# Patient Record
Sex: MALE | Race: BLACK OR AFRICAN AMERICAN
[De-identification: names, ages, dates, MRNs, and addresses within clinical notes are randomized per-mention and may not be internally consistent; named-entity substitution may affect disease eponyms.]

---

## 2019-06-26 ENCOUNTER — HOSPITAL ENCOUNTER (EMERGENCY)
Dept: HOSPITAL 26 - MED | Age: 59
Discharge: HOME | End: 2019-06-26
Payer: COMMERCIAL

## 2019-06-26 VITALS — DIASTOLIC BLOOD PRESSURE: 80 MMHG | SYSTOLIC BLOOD PRESSURE: 159 MMHG

## 2019-06-26 VITALS — DIASTOLIC BLOOD PRESSURE: 75 MMHG | SYSTOLIC BLOOD PRESSURE: 140 MMHG

## 2019-06-26 VITALS — WEIGHT: 178 LBS | HEIGHT: 65 IN | BODY MASS INDEX: 29.66 KG/M2

## 2019-06-26 DIAGNOSIS — R25.2: Primary | ICD-10-CM

## 2019-06-26 DIAGNOSIS — E86.0: ICD-10-CM

## 2019-06-26 LAB
ALBUMIN FLD-MCNC: 3.7 G/DL (ref 3.4–5)
ANION GAP SERPL CALCULATED.3IONS-SCNC: 12.2 MMOL/L (ref 8–16)
APPEARANCE UR: CLEAR
AST SERPL-CCNC: 53 U/L (ref 15–37)
BARBITURATES UR QL SCN: (no result) NG/ML
BASOPHILS # BLD AUTO: 0.1 K/UL (ref 0–0.22)
BASOPHILS NFR BLD AUTO: 1.7 % (ref 0–2)
BENZODIAZ UR QL SCN: (no result) NG/ML
BILIRUB SERPL-MCNC: 0.9 MG/DL (ref 0–1)
BILIRUB UR QL STRIP: NEGATIVE
BUN SERPL-MCNC: 17 MG/DL (ref 7–18)
BZE UR QL SCN: (no result) NG/ML
CANNABINOIDS UR QL SCN: (no result) NG/ML
CHLORIDE SERPL-SCNC: 108 MMOL/L (ref 98–107)
CO2 SERPL-SCNC: 26.4 MMOL/L (ref 21–32)
COLOR UR: YELLOW
CREAT SERPL-MCNC: 1.1 MG/DL (ref 0.7–1.3)
EOSINOPHIL # BLD AUTO: 0.2 K/UL (ref 0–0.4)
EOSINOPHIL NFR BLD AUTO: 2.1 % (ref 0–4)
ERYTHROCYTE [DISTWIDTH] IN BLOOD BY AUTOMATED COUNT: 13.5 % (ref 11.6–13.7)
GFR SERPL CREATININE-BSD FRML MDRD: 88 ML/MIN (ref 90–?)
GLUCOSE SERPL-MCNC: 71 MG/DL (ref 74–106)
GLUCOSE UR STRIP-MCNC: NEGATIVE MG/DL
HCT VFR BLD AUTO: 41.3 % (ref 36–52)
HGB BLD-MCNC: 13.9 G/DL (ref 12–18)
HGB UR QL STRIP: NEGATIVE
LEUKOCYTE ESTERASE UR QL STRIP: NEGATIVE
LYMPHOCYTES # BLD AUTO: 2.9 K/UL (ref 2–11.5)
LYMPHOCYTES NFR BLD AUTO: 39.9 % (ref 20.5–51.1)
MCH RBC QN AUTO: 34 PG (ref 27–31)
MCHC RBC AUTO-ENTMCNC: 34 G/DL (ref 33–37)
MCV RBC AUTO: 99.6 FL (ref 80–94)
MONOCYTES # BLD AUTO: 0.5 K/UL (ref 0.8–1)
MONOCYTES NFR BLD AUTO: 6.6 % (ref 1.7–9.3)
NEUTROPHILS # BLD AUTO: 3.6 K/UL (ref 1.8–7.7)
NEUTROPHILS NFR BLD AUTO: 49.7 % (ref 42.2–75.2)
NITRITE UR QL STRIP: NEGATIVE
OPIATES UR QL SCN: (no result) NG/ML
PCP UR QL SCN: (no result) NG/ML
PH UR STRIP: 5.5 [PH] (ref 5–9)
PLATELET # BLD AUTO: 201 K/UL (ref 140–450)
POTASSIUM SERPL-SCNC: 4.6 MMOL/L (ref 3.5–5.1)
RBC # BLD AUTO: 4.15 MIL/UL (ref 4.2–6.1)
SODIUM SERPL-SCNC: 142 MMOL/L (ref 136–145)
WBC # BLD AUTO: 7.2 K/UL (ref 4.8–10.8)

## 2019-06-26 PROCEDURE — 80053 COMPREHEN METABOLIC PANEL: CPT

## 2019-06-26 PROCEDURE — 36415 COLL VENOUS BLD VENIPUNCTURE: CPT

## 2019-06-26 PROCEDURE — 80305 DRUG TEST PRSMV DIR OPT OBS: CPT

## 2019-06-26 PROCEDURE — 85025 COMPLETE CBC W/AUTO DIFF WBC: CPT

## 2019-06-26 PROCEDURE — 96372 THER/PROPH/DIAG INJ SC/IM: CPT

## 2019-06-26 PROCEDURE — 81003 URINALYSIS AUTO W/O SCOPE: CPT

## 2019-06-26 PROCEDURE — 99283 EMERGENCY DEPT VISIT LOW MDM: CPT

## 2019-06-26 NOTE — NUR
PATIENT PRESENTS TO ED WITH C/O LT LEG PAIN FOR APPROX 2 WEEKS. PT DENIES ANY 
TRAUMA/INJURY. NO OBVIOUS DEFORMITY NOTED. +ROM TO AFFECTED EXTREMITY. PATIENT 
STATES PAIN OF 9/10 AT THIS TIME; VSS;  POSITIONED FOR COMFORT; BEDRAILS UP X1; 
BED DOWN. ER MD TO EVALUATE PT.

## 2019-07-03 ENCOUNTER — HOSPITAL ENCOUNTER (EMERGENCY)
Dept: HOSPITAL 26 - MED | Age: 59
Discharge: HOME | End: 2019-07-03
Payer: COMMERCIAL

## 2019-07-03 VITALS — SYSTOLIC BLOOD PRESSURE: 153 MMHG | DIASTOLIC BLOOD PRESSURE: 83 MMHG

## 2019-07-03 VITALS — WEIGHT: 176 LBS | BODY MASS INDEX: 29.32 KG/M2 | HEIGHT: 65 IN

## 2019-07-03 VITALS — SYSTOLIC BLOOD PRESSURE: 155 MMHG | DIASTOLIC BLOOD PRESSURE: 95 MMHG

## 2019-07-03 DIAGNOSIS — F17.210: ICD-10-CM

## 2019-07-03 DIAGNOSIS — M54.42: Primary | ICD-10-CM

## 2019-07-03 NOTE — NUR
Patient discharged with v/s stable. Written and verbal after care instructions 
given and explained. 

Patient alert, oriented and verbalized understanding of instructions. 
Ambulatory with steady gait. All questions addressed prior to discharge. ID 
band removed. Patient advised to follow up with PMD. Rx of Valium and Ibuprofen 
given. Patient educated on indication of medication including possible reaction 
and side effects. Opportunity to ask questions provided and answered.

## 2019-07-03 NOTE — NUR
PT C/O R LEG PAIN RADIATING FROM HIP TO CALF, 8/10, STABBING X 1 WEEK. PT WAS 
SEEN HERE 6/26/19, AND IS STILL HAVING PAIN. DENIES INJURY. AMBULATORY WITH 
STEADY GAIT. DENIES N/V/D; SKIN IS PINK/WARM/DRY; AAOX4; PT DENIES ANY FEVER, 
CP, SOB, OR COUGH AT THIS TIME; PATIENT STATES PAIN OF 8/10 AT THIS TIME; VSS; 
PATIENT POSITIONED FOR COMFORT; HOB ELEVATED; BEDRAILS UP X1; BED DOWN. ER MD 
MADE AWARE OF PT STATUS.

## 2019-07-08 ENCOUNTER — HOSPITAL ENCOUNTER (EMERGENCY)
Dept: HOSPITAL 26 - MED | Age: 59
Discharge: HOME | End: 2019-07-08
Payer: COMMERCIAL

## 2019-07-08 VITALS — HEIGHT: 65 IN | BODY MASS INDEX: 29.55 KG/M2 | WEIGHT: 177.38 LBS

## 2019-07-08 VITALS — SYSTOLIC BLOOD PRESSURE: 164 MMHG | DIASTOLIC BLOOD PRESSURE: 75 MMHG

## 2019-07-08 VITALS — SYSTOLIC BLOOD PRESSURE: 146 MMHG | DIASTOLIC BLOOD PRESSURE: 82 MMHG

## 2019-07-08 DIAGNOSIS — F17.210: ICD-10-CM

## 2019-07-08 DIAGNOSIS — M79.605: ICD-10-CM

## 2019-07-08 DIAGNOSIS — G89.29: Primary | ICD-10-CM

## 2019-07-08 NOTE — NUR
Patient discharged with v/s stable. Written and verbal after care instructions 
given and explained. 

Patient alert, oriented and verbalized understanding of instructions. 
Ambulatory with steady gait. All questions addressed prior to discharge. ID 
band removed. Patient advised to follow up with PMD. Rx of NAPROSYN AND 
TRAMADOL given. Patient educated on indication of medication including possible 
reaction and side effects. Opportunity to ask questions provided and answered.

## 2019-07-08 NOTE — NUR
PT BIB SELF TO THE ED WITH THE CHIEF C/O LEFT LEG PAIN FOR 3 WEEKS. DENIES 
FALLS. AMBULATES WITH PAIN. PT WAS HERE LAST WEEK FOR SAME REASON. TAKING 
IBUPROFEN AND VALIUM FROM LAST VISIT. STATES PAIN OF 6/10 AT THIS TIME. DENIES 
ANY OTHER PROBLEM.

## 2019-07-17 ENCOUNTER — HOSPITAL ENCOUNTER (EMERGENCY)
Dept: HOSPITAL 26 - MED | Age: 59
Discharge: HOME | End: 2019-07-17
Payer: COMMERCIAL

## 2019-07-17 VITALS — DIASTOLIC BLOOD PRESSURE: 73 MMHG | SYSTOLIC BLOOD PRESSURE: 138 MMHG

## 2019-07-17 VITALS — SYSTOLIC BLOOD PRESSURE: 132 MMHG | DIASTOLIC BLOOD PRESSURE: 83 MMHG

## 2019-07-17 VITALS — BODY MASS INDEX: 29.99 KG/M2 | HEIGHT: 65 IN | WEIGHT: 180 LBS

## 2019-07-17 DIAGNOSIS — Z71.6: ICD-10-CM

## 2019-07-17 DIAGNOSIS — M79.605: Primary | ICD-10-CM

## 2019-07-17 DIAGNOSIS — F17.200: ICD-10-CM

## 2019-07-17 LAB
ALBUMIN FLD-MCNC: 3.9 G/DL (ref 3.4–5)
ANION GAP SERPL CALCULATED.3IONS-SCNC: 16.2 MMOL/L (ref 8–16)
APPEARANCE UR: CLEAR
AST SERPL-CCNC: 33 U/L (ref 15–37)
BASOPHILS # BLD AUTO: 0.1 K/UL (ref 0–0.22)
BASOPHILS NFR BLD AUTO: 1.1 % (ref 0–2)
BILIRUB SERPL-MCNC: 0.9 MG/DL (ref 0–1)
BILIRUB UR QL STRIP: NEGATIVE
BUN SERPL-MCNC: 19 MG/DL (ref 7–18)
CHLORIDE SERPL-SCNC: 107 MMOL/L (ref 98–107)
CO2 SERPL-SCNC: 24.2 MMOL/L (ref 21–32)
COLOR UR: YELLOW
CREAT SERPL-MCNC: 1.2 MG/DL (ref 0.7–1.3)
EOSINOPHIL # BLD AUTO: 0.3 K/UL (ref 0–0.4)
EOSINOPHIL NFR BLD AUTO: 4 % (ref 0–4)
ERYTHROCYTE [DISTWIDTH] IN BLOOD BY AUTOMATED COUNT: 13.2 % (ref 11.6–13.7)
GFR SERPL CREATININE-BSD FRML MDRD: 80 ML/MIN (ref 90–?)
GLUCOSE SERPL-MCNC: 78 MG/DL (ref 74–106)
GLUCOSE UR STRIP-MCNC: NEGATIVE MG/DL
HCT VFR BLD AUTO: 41.9 % (ref 36–52)
HGB BLD-MCNC: 14 G/DL (ref 12–18)
HGB UR QL STRIP: NEGATIVE
LEUKOCYTE ESTERASE UR QL STRIP: NEGATIVE
LYMPHOCYTES # BLD AUTO: 3.1 K/UL (ref 2–11.5)
LYMPHOCYTES NFR BLD AUTO: 43.8 % (ref 20.5–51.1)
MCH RBC QN AUTO: 33 PG (ref 27–31)
MCHC RBC AUTO-ENTMCNC: 33 G/DL (ref 33–37)
MCV RBC AUTO: 99.5 FL (ref 80–94)
MONOCYTES # BLD AUTO: 0.5 K/UL (ref 0.8–1)
MONOCYTES NFR BLD AUTO: 7.4 % (ref 1.7–9.3)
NEUTROPHILS # BLD AUTO: 3.1 K/UL (ref 1.8–7.7)
NEUTROPHILS NFR BLD AUTO: 43.7 % (ref 42.2–75.2)
NITRITE UR QL STRIP: NEGATIVE
PH UR STRIP: 5.5 [PH] (ref 5–9)
PLATELET # BLD AUTO: 198 K/UL (ref 140–450)
POTASSIUM SERPL-SCNC: 4.4 MMOL/L (ref 3.5–5.1)
RBC # BLD AUTO: 4.22 MIL/UL (ref 4.2–6.1)
SODIUM SERPL-SCNC: 143 MMOL/L (ref 136–145)
WBC # BLD AUTO: 7.1 K/UL (ref 4.8–10.8)

## 2019-07-17 PROCEDURE — 36415 COLL VENOUS BLD VENIPUNCTURE: CPT

## 2019-07-17 PROCEDURE — 99284 EMERGENCY DEPT VISIT MOD MDM: CPT

## 2019-07-17 PROCEDURE — 81003 URINALYSIS AUTO W/O SCOPE: CPT

## 2019-07-17 PROCEDURE — 85379 FIBRIN DEGRADATION QUANT: CPT

## 2019-07-17 PROCEDURE — 85025 COMPLETE CBC W/AUTO DIFF WBC: CPT

## 2019-07-17 PROCEDURE — 93971 EXTREMITY STUDY: CPT

## 2019-07-17 PROCEDURE — 80053 COMPREHEN METABOLIC PANEL: CPT

## 2019-07-17 NOTE — NUR
PT IS A 59 Y/O MALE WHO PRESENTS TO THE ED C/O L LEG PAIN X1 MONTH. PT WAS 
RECENTLY SEEN IN ED AND GIVEN RX OF MOTRIN AND TRAMADOL WITH NO RELIEF. PT 
REPORTS 10/10 ACHING L LEG PAIN THAT DOES NOT RADIATE. NO OBVIOUS TX/DEFORMITY. 
PT AWAKE AND ALERT, RR EVEN/UNLABORED. PT REPOSITIONED FOR COMFORT, PT SITTING 
IN CHAIR. ER PROVIDER NOTIFIED. WILL CONTINUE TO MONITOR. 



MEDHX:DENIES

RX:IBUPROFEN, TRAMADOL

## 2019-07-17 NOTE — NUR
Patient discharged with v/s stable. Written and verbal after care instructions 
given and explained. 

Patient alert, oriented and verbalized understanding of instructions. 
Ambulatory with steady gait. All questions addressed prior to discharge. ID 
band removed. Patient advised to follow up with PMD. Rx of DIPHENHYDRAMINE 
HYCL, TRAMADOL HCL, IBUPROFEN  given. Patient educated on indication of 
medication including possible reaction and side effects. Opportunity to ask 
questions provided and answered.

## 2019-07-17 NOTE — NUR
PT C/O LT LEG PAIN X1 MONTH. PT REPORTS CRAMPING LEG PAIN THAT INCREASES WITH 
WALKING. PT WAS SEEN HERE LAST WEEK FOR SAME SYMPTOMS, WAS TOLD TO SEE AN 
ORTHOPEDIC SPECIALIST, APPOINTMENT NEXT WEEK FOR PCP IN ORDER TO GET A REFERAL 
FOR SPECIALIST. DENIES ANY WARM, REDNESS  UNDER THE CALF AT THIS TIME. PT LYING 
COMFORTABLY IN HIS BED. WILL CONITNUE TO MONITOR PT.



MEDHX:DENIES

RX:IBUPROFEN, TRAMADOL

## 2019-08-02 ENCOUNTER — HOSPITAL ENCOUNTER (EMERGENCY)
Dept: HOSPITAL 26 - MED | Age: 59
Discharge: HOME | End: 2019-08-02
Payer: COMMERCIAL

## 2019-08-02 VITALS — WEIGHT: 176 LBS | BODY MASS INDEX: 29.32 KG/M2 | HEIGHT: 65 IN

## 2019-08-02 VITALS — DIASTOLIC BLOOD PRESSURE: 88 MMHG | SYSTOLIC BLOOD PRESSURE: 169 MMHG

## 2019-08-02 VITALS — SYSTOLIC BLOOD PRESSURE: 169 MMHG | DIASTOLIC BLOOD PRESSURE: 88 MMHG

## 2019-08-02 DIAGNOSIS — R25.2: Primary | ICD-10-CM

## 2019-08-02 NOTE — NUR
C/O L LEG PAIN RADIATING DOWN LEG TO ANKLE 10/10 X 2 MONTHS. PT WAS RECENTLY 
SEEN BY PCP AND GIVEN FLEXERIL BUT PT STATES IT IS NOT HELPING ANYMORE. NO 
OBVIOUS INJURY OR DEFORMITY TO L LEG, PT DENIES INJURY. 



HX: NONE 

RX: FLEXERIL, IBUPROFEN

## 2019-08-02 NOTE — NUR
59M C/O LEFT POSTERIOR LEG CRAMPING RADIATING TO ANKLES ON/OFF X 2 MONTHS. PCP 
RX FLEXERIL WHICH PROVIDES SOME RELIEF. DENIES REDNESS AND WARMTH. NO RECENTS 
TRAVEL. NO TENDERNESS TO PALPATION AT THIS TIME. ERMD TO OVIDIO PT.